# Patient Record
Sex: FEMALE | Race: BLACK OR AFRICAN AMERICAN | URBAN - METROPOLITAN AREA
[De-identification: names, ages, dates, MRNs, and addresses within clinical notes are randomized per-mention and may not be internally consistent; named-entity substitution may affect disease eponyms.]

---
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
## 2021-02-23 ENCOUNTER — PRE VISIT (OUTPATIENT)
Dept: OTOLARYNGOLOGY | Facility: CLINIC | Age: 21
End: 2021-02-23

## 2021-04-18 ENCOUNTER — HEALTH MAINTENANCE LETTER (OUTPATIENT)
Age: 21
End: 2021-04-18

## 2021-06-01 ENCOUNTER — IMMUNIZATION (OUTPATIENT)
Dept: NURSING | Facility: CLINIC | Age: 21
End: 2021-06-01
Payer: COMMERCIAL

## 2021-06-01 PROCEDURE — 91300 PR COVID VAC PFIZER DIL RECON 30 MCG/0.3 ML IM: CPT

## 2021-06-01 PROCEDURE — 0001A PR COVID VAC PFIZER DIL RECON 30 MCG/0.3 ML IM: CPT

## 2021-06-22 ENCOUNTER — IMMUNIZATION (OUTPATIENT)
Dept: NURSING | Facility: CLINIC | Age: 21
End: 2021-06-22
Attending: INTERNAL MEDICINE
Payer: COMMERCIAL

## 2021-06-22 PROCEDURE — 91300 PR COVID VAC PFIZER DIL RECON 30 MCG/0.3 ML IM: CPT

## 2021-06-22 PROCEDURE — 0002A PR COVID VAC PFIZER DIL RECON 30 MCG/0.3 ML IM: CPT

## 2021-10-02 ENCOUNTER — HEALTH MAINTENANCE LETTER (OUTPATIENT)
Age: 21
End: 2021-10-02

## 2022-05-14 ENCOUNTER — HEALTH MAINTENANCE LETTER (OUTPATIENT)
Age: 22
End: 2022-05-14

## 2022-09-03 ENCOUNTER — HEALTH MAINTENANCE LETTER (OUTPATIENT)
Age: 22
End: 2022-09-03

## 2023-04-11 ENCOUNTER — MEDICAL CORRESPONDENCE (OUTPATIENT)
Dept: HEALTH INFORMATION MANAGEMENT | Facility: CLINIC | Age: 23
End: 2023-04-11
Payer: COMMERCIAL

## 2023-05-15 ENCOUNTER — TRANSCRIBE ORDERS (OUTPATIENT)
Dept: OTHER | Age: 23
End: 2023-05-15

## 2023-05-15 DIAGNOSIS — H90.3 ASYMMETRICAL SENSORINEURAL HEARING LOSS: ICD-10-CM

## 2023-05-15 DIAGNOSIS — R62.50 DEVELOPMENTAL DELAY: Primary | ICD-10-CM

## 2023-06-03 ENCOUNTER — HEALTH MAINTENANCE LETTER (OUTPATIENT)
Age: 23
End: 2023-06-03

## 2023-11-30 ENCOUNTER — OFFICE VISIT (OUTPATIENT)
Dept: AUDIOLOGY | Facility: CLINIC | Age: 23
End: 2023-11-30
Attending: PEDIATRICS
Payer: COMMERCIAL

## 2023-11-30 DIAGNOSIS — H90.3 ASYMMETRICAL SENSORINEURAL HEARING LOSS: ICD-10-CM

## 2023-11-30 DIAGNOSIS — R62.50 DEVELOPMENTAL DELAY: ICD-10-CM

## 2023-11-30 PROCEDURE — 92550 TYMPANOMETRY & REFLEX THRESH: CPT | Performed by: AUDIOLOGIST

## 2023-11-30 PROCEDURE — 92557 COMPREHENSIVE HEARING TEST: CPT | Performed by: AUDIOLOGIST

## 2023-11-30 PROCEDURE — V5299 HEARING SERVICE: HCPCS | Performed by: AUDIOLOGIST

## 2023-11-30 NOTE — PROGRESS NOTES
"AUDIOLOGY REPORT    SUBJECTIVE:  Mauricio Montoya is a 23 year old female who was seen in the Audiology Clinic at the [facility] for audiologic evaluation, referred by Delores Faulkner M.D. .The patient has been seen previously on 02/07/2020 for assessment and results indicated a normal sloping to severe sensorineural hearing loss in the right, and normal sloping to severe, rising to moderate sensorineural hearing loss  In the left ear.  The patient has been seen previously in the [facility] Audiology clinic on 1/31/2013 for fitting of bilateral [employer] 600 13 behind-the-ear hearing aids.The patient reports a that she does not wear them very often as she irene snot \"like all the noise\". She does reports she thinks the batteries are dead.   The patient denies  bilateral otalgia, bilateral drainage, bilateral aural fullness, and dizziness. She does report intermittent bilateral tinnitus on occasion. She is unsure if her hearing has decreased. They were accompanied today by their mother. It is noted that the patient has some developmental delays. A third year Audiology Doctoral student was present during today's testing.     OBJECTIVE:  Abuse Screening:  Do you feel unsafe at home or work/school? No  Do you feel threatened by someone? No  Does anyone try to keep you from having contact with others, or doing things outside of your home? No  Physical signs of abuse present? No     Otoscopic exam indicates ears are clear of cerumen bilaterally     Pure Tone Thresholds assessed using conventional audiometry with good  reliability from 250-8000 Hz bilaterally using insert earphones and circumaural headphones     RIGHT:  normal sloping to severe sensorineural hearing loss    LEFT:    normal sloping to severe rising to moderately-severe sensorineural hearing loss     Tympanogram:    RIGHT: normal eardrum mobility    LEFT:   normal eardrum mobility    Reflexes (reported by " stimulus ear):  RIGHT: Ipsilateral is present at normal levels  RIGHT: Contralateral is present at normal levels  LEFT:   Ipsilateral is present at normal levels  LEFT:   Contralateral is present at normal levels      Speech Reception Threshold:    RIGHT: 20 dB HL    LEFT:   25 dB HL  Word Recognition Score:     RIGHT: 88% at 85 dB HL using NU-6 recorded word list.    LEFT:   96% at 85 dB HL using NU-6 recorded word list.    Hearing aid check completed. Both batteries are dead and have clearly been sitting in the hearing aids for a bit as a white residue is present on the batteries. Hearing aids are cleaned. Biological listening check reveals good sound quality bilaterally.     ASSESSMENT:   Compared to patient's previous audiogram dated 02/07/2020, hearing has remained stable bilaterally. Discussed that the patient should  make an appointment for a hearing aid check as adjusting some of the settings may help with the noise. Also discussed the importance of continues wear,and that a lot of background noise becomes less noticable if the hearing aids are worn daily. Recommended that Rawiya start by trying to wear the hearing aid at least 4-6 hours each day. Today s results were discussed with the patient in detail.     PLAN:  Patient was counseled regarding hearing loss and impact on communication.   It is recommended that the patient return for a hearing aid check for some fine tuning of hearing aids.  Please call this clinic with questions regarding these results or recommendations.    I was present with the patient for the entire Audiology appointment including all procedures/testing performed by the AuD student, and agree with the student s assessment and plan as documented.      Franklin Byrnes, [facility]  Licensed Audiologist  [place] #[id number]

## 2024-07-06 ENCOUNTER — HEALTH MAINTENANCE LETTER (OUTPATIENT)
Age: 24
End: 2024-07-06

## 2025-01-13 NOTE — NURSING NOTE
Chief Complaint   Patient presents with     Consult     asymmetric sensorineural hearing loss        Rodri Dominguez LPN    
[[id number]]

## 2025-07-13 ENCOUNTER — HEALTH MAINTENANCE LETTER (OUTPATIENT)
Age: 25
End: 2025-07-13